# Patient Record
Sex: MALE | Race: WHITE | NOT HISPANIC OR LATINO | URBAN - METROPOLITAN AREA
[De-identification: names, ages, dates, MRNs, and addresses within clinical notes are randomized per-mention and may not be internally consistent; named-entity substitution may affect disease eponyms.]

---

## 2017-02-24 ENCOUNTER — OFFICE VISIT - HEALTHEAST (OUTPATIENT)
Dept: FAMILY MEDICINE | Facility: CLINIC | Age: 32
End: 2017-02-24

## 2017-02-24 DIAGNOSIS — Z01.818 PREOPERATIVE EXAMINATION: ICD-10-CM

## 2017-02-24 DIAGNOSIS — D69.6 TEMPORARY LOW PLATELET COUNT (H): ICD-10-CM

## 2017-02-24 ASSESSMENT — MIFFLIN-ST. JEOR: SCORE: 1977.5

## 2017-03-25 ENCOUNTER — OFFICE VISIT - HEALTHEAST (OUTPATIENT)
Dept: FAMILY MEDICINE | Facility: CLINIC | Age: 32
End: 2017-03-25

## 2017-03-25 DIAGNOSIS — R05.9 COUGH: ICD-10-CM

## 2017-03-25 DIAGNOSIS — H65.91 MIDDLE EAR EFFUSION, RIGHT: ICD-10-CM

## 2017-03-25 DIAGNOSIS — J11.1 INFLUENZA-LIKE ILLNESS: ICD-10-CM

## 2017-03-31 ENCOUNTER — OFFICE VISIT - HEALTHEAST (OUTPATIENT)
Dept: FAMILY MEDICINE | Facility: CLINIC | Age: 32
End: 2017-03-31

## 2017-03-31 DIAGNOSIS — H92.01 EAR PAIN, REFERRED, RIGHT: ICD-10-CM

## 2017-03-31 DIAGNOSIS — H69.91 EUSTACHIAN TUBE DISORDER, RIGHT: ICD-10-CM

## 2017-09-11 ENCOUNTER — COMMUNICATION - HEALTHEAST (OUTPATIENT)
Dept: TELEHEALTH | Facility: CLINIC | Age: 32
End: 2017-09-11

## 2017-09-11 ENCOUNTER — OFFICE VISIT - HEALTHEAST (OUTPATIENT)
Dept: FAMILY MEDICINE | Facility: CLINIC | Age: 32
End: 2017-09-11

## 2017-09-11 ENCOUNTER — COMMUNICATION - HEALTHEAST (OUTPATIENT)
Dept: FAMILY MEDICINE | Facility: CLINIC | Age: 32
End: 2017-09-11

## 2017-09-11 DIAGNOSIS — M54.50 LOW BACK PAIN: ICD-10-CM

## 2017-09-11 DIAGNOSIS — L72.3 SEBACEOUS CYST: ICD-10-CM

## 2017-09-14 ENCOUNTER — COMMUNICATION - HEALTHEAST (OUTPATIENT)
Dept: HEALTH INFORMATION MANAGEMENT | Facility: CLINIC | Age: 32
End: 2017-09-14

## 2017-09-20 ENCOUNTER — RECORDS - HEALTHEAST (OUTPATIENT)
Dept: ADMINISTRATIVE | Facility: OTHER | Age: 32
End: 2017-09-20

## 2018-01-05 ENCOUNTER — OFFICE VISIT - HEALTHEAST (OUTPATIENT)
Dept: FAMILY MEDICINE | Facility: CLINIC | Age: 33
End: 2018-01-05

## 2018-01-05 DIAGNOSIS — R07.9 CHEST PAIN: ICD-10-CM

## 2018-01-05 DIAGNOSIS — R09.1 PLEURISY: ICD-10-CM

## 2018-05-20 ENCOUNTER — OFFICE VISIT - HEALTHEAST (OUTPATIENT)
Dept: FAMILY MEDICINE | Facility: CLINIC | Age: 33
End: 2018-05-20

## 2018-05-20 DIAGNOSIS — R09.81 CONGESTION OF NASAL SINUS: ICD-10-CM

## 2018-08-06 ENCOUNTER — OFFICE VISIT - HEALTHEAST (OUTPATIENT)
Dept: FAMILY MEDICINE | Facility: CLINIC | Age: 33
End: 2018-08-06

## 2018-08-06 DIAGNOSIS — H66.91 RIGHT OTITIS MEDIA: ICD-10-CM

## 2018-08-06 DIAGNOSIS — J06.9 URI (UPPER RESPIRATORY INFECTION): ICD-10-CM

## 2018-08-06 LAB — DEPRECATED S PYO AG THROAT QL EIA: NORMAL

## 2018-08-07 LAB — GROUP A STREP BY PCR: NORMAL

## 2018-10-10 ENCOUNTER — AMBULATORY - HEALTHEAST (OUTPATIENT)
Dept: NURSING | Facility: CLINIC | Age: 33
End: 2018-10-10

## 2018-10-18 ENCOUNTER — OFFICE VISIT - HEALTHEAST (OUTPATIENT)
Dept: FAMILY MEDICINE | Facility: CLINIC | Age: 33
End: 2018-10-18

## 2018-10-18 DIAGNOSIS — H66.90 AOM (ACUTE OTITIS MEDIA): ICD-10-CM

## 2021-05-28 ENCOUNTER — RECORDS - HEALTHEAST (OUTPATIENT)
Dept: ADMINISTRATIVE | Facility: CLINIC | Age: 36
End: 2021-05-28

## 2021-05-30 VITALS — BODY MASS INDEX: 30.39 KG/M2 | WEIGHT: 221 LBS

## 2021-05-30 VITALS — BODY MASS INDEX: 30.08 KG/M2 | HEIGHT: 72 IN | WEIGHT: 222.1 LBS

## 2021-05-31 VITALS — WEIGHT: 231.1 LBS | BODY MASS INDEX: 31.78 KG/M2

## 2021-05-31 VITALS — WEIGHT: 225.5 LBS | BODY MASS INDEX: 31.01 KG/M2

## 2021-06-01 VITALS — WEIGHT: 215.58 LBS | BODY MASS INDEX: 29.65 KG/M2

## 2021-06-01 VITALS — BODY MASS INDEX: 28.61 KG/M2 | WEIGHT: 208 LBS

## 2021-06-02 VITALS — BODY MASS INDEX: 31.12 KG/M2 | WEIGHT: 226.3 LBS

## 2021-06-09 NOTE — PROGRESS NOTES
Assessment/Plan:      Visit for Preoperative Exam.     Patient approved for surgery with general or local anesthesia. Above recommendations were reviewed with the patient. Copy of the pre-op was given to the patient to bring along on the day of surgery. Follow up as needed. No active cardiac conditions.     Subjective:     Scheduled Procedure: bilateral vasectomy   Surgery Date:  3/17/17  Surgery Location:  [facility]  Surgeon:  Dr. Peres    History of passing out with shots, watching wife's spinal tap and son's circumcision. Also anxiety    Has a history of bleeding a bit more than expected; History of slightly low platelets    Current Outpatient Prescriptions   Medication Sig Dispense Refill     cyclobenzaprine (FLEXERIL) 10 MG tablet 1/2 to 1 tab twice a day prn  for 2-5 days as needed for muscle spasm of back 10 tablet 0     ibuprofen (ADVIL,MOTRIN) 200 MG tablet Take 600 mg by mouth every 6 (six) hours as needed for pain.       multivitamin therapeutic (THERAGRAN) tablet Take 1 tablet by mouth daily.       naproxen (NAPROSYN) 500 MG tablet Half to 1 tab twice a day prn for 4-5 days as needed for back pain 10 tablet 0     VITAMIN B COMPLEX (B COMPLEX 1 ORAL) Take by mouth daily.       No current facility-administered medications for this visit.        No Known Allergies    Immunization History   Administered Date(s) Administered     Influenza, inj, historic 12/19/2012     Influenza, seasonal,quad inj 36+ mos 10/05/2015     Tdap 02/01/2009       Patient Active Problem List   Diagnosis     Palpitations     Allergies     Spina bifida occulta       Past Medical History:   Diagnosis Date     H/O scarlet fever second grade       Social History     Social History     Marital status:      Spouse name: Brittanie     Number of children: 2     Years of education: 4     Occupational History      for now Running [employer]     Social History Main Topics     Smoking status: Never Smoker      "Smokeless tobacco: Not on file     Alcohol use 1.8 oz/week     3 Cans of beer per week     Drug use: No     Sexual activity: Yes     Birth control/ protection: None     Other Topics Concern     Not on file     Social History Narrative    [employer]    Law enforcement degree       Past Surgical History:   Procedure Laterality Date     WISDOM TOOTH EXTRACTION  @ 19       History of Present Illness  Recent Health  Fever: no  Chills: no  Fatigue: no  Chest Pain: no  Cough: no  Dyspnea: no  Urinary Frequency: no  Nausea: no  Vomiting: no  Diarrhea: no  Abdominal Pain: no  Easy Bruising: no, does report that with tattoos, that he bleeds a bit more than expected  Lower Extremity Swelling: no  Poor Exercise Tolerance: no    Most recent Health Maintenance Visit:  2 year(s) ago    Pertinent History  Prior Anesthesia: yes  Previous Anesthesia Reaction:  no  Diabetes: no  Cardiovascular Disease: no  Pulmonary Disease: no  Renal Disease: no  GI Disease: no  Sleep Apnea: no  Thromboembolic Problems: no  Clotting Disorder: no  Bleeding Disorder: no  Transfusion Reaction: no  Impaired Immunity: no  Steroid use in the last 6 months: no  Frequent Aspirin use: no    Family history of none    Social history of there is no transfusion refusal    After surgery, the patient plans to recover at home with family.    Review of Systems  A 12 point comprehensive review of systems was negative except as noted.          Objective:         Vitals:    02/24/17 1400   BP: 116/80   Pulse: 72   Weight: 222 lb 1.6 oz (100.7 kg)   Height: 5' 11.5\" (1.816 m)       Physical Exam:  Physical Exam:  General Appearance: Alert, cooperative, no distress, appears stated age  Head: Normocephalic, without obvious abnormality, atraumatic  Eyes: PERRL, conjunctiva/corneas clear, EOM's intact  Ears: Normal TM's and external ear canals, both ears  Nose: Nares normal, septum midline,mucosa normal, no drainage  Throat: Lips, mucosa, and " tongue normal; teeth and gums normal  Neck: Supple, symmetrical, trachea midline, no adenopathy;  thyroid: not enlarged, symmetric, no tenderness/mass/nodules; no carotid bruit or JVD  Back: Symmetric, no curvature, ROM normal, no CVA tenderness  Lungs: Clear to auscultation bilaterally, respirations unlabored  Heart: Regular rate and rhythm, S1 and S2 normal, no murmur, rub, or gallop,  Abdomen: Soft, non-tender, bowel sounds active all four quadrants,  no masses, no organomegaly   Musculoskeletal: Normal range of motion. No joint swelling or deformity.   Extremities: Extremities normal, atraumatic, no cyanosis or edema  Skin: Skin color, texture, turgor normal, no rashes or lesions  Lymph nodes: Cervical, supraclavicular, and axillary nodes normal  Neurologic: He is alert. He has normal reflexes.   Psychiatric: He has a normal mood and affect.       CBC and INR today

## 2021-06-09 NOTE — PROGRESS NOTES
Chief complaint right ear pain with fluid    HPI: The patient was seen at walk-in care about 5 days ago and told he had fluid in his ears but he still having some ear pain on the right and muffled sounds on the right and wonders how long it is going to take to get better.  He has no fevers chills sweats and the Mucinex and Sudafed have helped only marginally.    Objective:/70 (Patient Site: Right Arm, Patient Position: Sitting, Cuff Size: Adult Regular)  Pulse 76  He is in no acute distress.  His nasal mucosa are pretty unremarkable he has no facial tenderness no posterior pharyngeal drainage neck is supple without lymphadenopathy.  TMs are gray bilaterally with a little bit of congestion noted but no thickening, pus, or erythema to the eardrum itself    Assessment: Probable eustachian tube dysfunction    Plan: We will start with Afrin or Dristan followed by Flonase for a week or 2 although the Afrin only 3 days and then if things are not getting better or if they are getting worse then we will try antibiotic and I gave him a printed prescription to fill if he needed it today

## 2021-06-09 NOTE — PROGRESS NOTES
ASSESSMENT:   1. Middle ear effusion, right     2. Influenza-like illness     3. Cough  albuterol (PROVENTIL HFA;VENTOLIN HFA) 90 mcg/actuation inhaler    inhalational spacing device (AEROCHAMBER MV) Spcr        PLAN:  Ear symptoms likely secondary to effusion/eustachian tube dysfunction.  Recommend Sudafed 120m tablet every 12 hours as needed for congestion/ear pressure.  May want to skip nighttime dose due to insomnia.  Follow up with primary care provider if not improving in 3-5 days, sooner if any worsening or new symptoms. Advised it may take a week or two for tinnitus and hearing to improve but should gradually get better.     Suspect patient had influenza though out of treatment window and he is improving, thus testing was deferred today.  Ventolin inhaler: 2 puffs every 4-6 hours as needed for cough. Use with spacer.  Teaching provided by nursing staff. Gradually taper over 5-7 days as cough improves. Follow up with primary care provider if not improving in 5-7 days, sooner if any worsening or new symptoms such as shortness of breath, fever returns, or you feel more ill.    SUBJECTIVE:   Braxton Leroy is a 31 y.o. male presents today with 3 days complaint of right ear popping.  He had a bad coughing episode and felt his right ear pop and felt a sharp pain. He has chronic tinnitus and known moderate sensorineural hearing loss bilaterally. Was assessed by audiology about a year ago. Tinnitus has been more pronounced and hearing feels muffled in the right ear.  He also complains of nasal congestion, rhinorrhea, body aches, chills, and cough for 5 days.  Has not checked temperature at home. Feels symptoms have improved other than his cough. Sick contacts: none, though works in a [employer] and is around a lot of people. Has tried Dayquil/Nyquil without relief. He did not receive a flu shot this year. History of asthma as a child but has not used inhalers as an adult.  He used his wife's albuterol inhaler  which he feels was helpful for his cough.    Denies ear drainage. . Denies shortness of breath, chest pain.Denies vomiting, diarrhea.     Patient Active Problem List   Diagnosis     Palpitations     Allergies     Spina bifida occulta       History   Smoking Status     Never Smoker   Smokeless Tobacco     Not on file       Current Medications:  Current Outpatient Prescriptions on File Prior to Visit   Medication Sig Dispense Refill     cyclobenzaprine (FLEXERIL) 10 MG tablet 1/2 to 1 tab twice a day prn  for 2-5 days as needed for muscle spasm of back 10 tablet 0     ibuprofen (ADVIL,MOTRIN) 200 MG tablet Take 600 mg by mouth every 6 (six) hours as needed for pain.       multivitamin therapeutic (THERAGRAN) tablet Take 1 tablet by mouth daily.       naproxen (NAPROSYN) 500 MG tablet Half to 1 tab twice a day prn for 4-5 days as needed for back pain 10 tablet 0     VITAMIN B COMPLEX (B COMPLEX 1 ORAL) Take by mouth daily.       No current facility-administered medications on file prior to visit.        Allergies:   No Known Allergies    OBJECTIVE:   Vitals:    03/25/17 1038   BP: 118/62   Pulse: 77   Resp: 18   Temp: 98.1  F (36.7  C)   TempSrc: Oral   SpO2: 96%   Weight: 221 lb (100.2 kg)     Physical exam reveals a pleasant 31 y.o. male.   Appears healthy, alert, cooperative and in NAD.  Eyes:  No conjunctivitis, lids normal.   Ears:  Both external ear canals clear of cerumen and without edema or erythema. R TM is thin, has serous fluid, but good light reflex. No erythema of TM. L TM is gray with a thin membrane and good light reflex.   Nose:    Mucosa normal. Scant clear rhinorrhea  Mouth:  Mucosa pink and moist.  no pharyngitis, no exudate and uvula is midline.    Lymph: no cervical LAD  Lungs: Chest is clear, no wheezing, rhonchi or rales. Symmetric air entry throughout both lung fields.  Heart: regular rate and rhythm, no murmur, rub or gallop

## 2021-06-18 NOTE — PROGRESS NOTES
Chief Complaint   Patient presents with     URI     Sinus infection, x 3 days. Dark, bloody phlegm when coughing. Congestion in back of throat. Throat irritation and scratchy.          HPI    Patient is here for 3 days of nasal discharge and congestion with postnasal drip. No cough, fever, sinus pain.    ROS: Pertinent ROS noted in HPI.     Allergies   Allergen Reactions     Other Allergy (See Comments) Itching       Patient Active Problem List   Diagnosis     Palpitations     Allergies     Spina bifida occulta       Family History   Problem Relation Age of Onset     Arthritis Father      Diabetes Paternal Uncle      Heart disease Maternal Grandmother      Heart disease Maternal Grandfather      Heart disease Paternal Grandmother      Arthritis Paternal Grandmother      Diabetes Paternal Grandfather        Social History     Social History     Marital status:      Spouse name: Brittanie     Number of children: 2     Years of education: 4     Occupational History      for now Running [employer]     Social History Main Topics     Smoking status: Never Smoker     Smokeless tobacco: Never Used     Alcohol use 1.8 oz/week     3 Cans of beer per week     Drug use: No     Sexual activity: Yes     Birth control/ protection: None     Other Topics Concern     Not on file     Social History Narrative    [employer]    Law enforcement degree         Objective:    Vitals:    05/20/18 1134   BP: 120/70   Pulse: 71   Resp: 16   Temp: 98.1  F (36.7  C)   SpO2: 98%       Gen:NAD  Throat: oropharynx clear, tonsils normal  Ears: TMs clear without effusion, ear canals normal with minimal cerumen  Nose: no discharge, normal nasal mucosa  Sinus: no pain to percussion  Neck:NAD  CV: RRR, normal S1S2, no M, R, G  Pulm: CTAB, normal effort    Impression:    Congestion of nasal sinus - benign exam    Plan:    Supportive cares as directed.  F/u as directed.

## 2021-06-19 NOTE — PROGRESS NOTES
"ASSESSMENT:   1. Right otitis media  Rapid Strep A Screen-Throat    amoxicillin (AMOXIL) 875 MG tablet   2. URI (upper respiratory infection)         PLAN:  32-year-old male presents for evaluation of URI symptoms.  Exam does reveal a right otitis media.  He is prescribed a 10 day course of amoxicillin to treat this.  Rapid strep testing is negative.  Symptomatic care is advised for URI symptoms.  He will follow-up with primary care in 1 week if no improvement in symptoms.  Will return to clinic with new or worsening symptoms.    I discussed red flag symptoms, return precautions to clinic/ER and follow up care with patient/parent.  Expected clinical course, symptomatic cares advised. Questions answered. Patient/parent amenable with plan.    Patient Instructions:  Patient Instructions   You have an infection of your right ear.    Take amoxicillin twice daily with food. Take a probiotic such as Culturelle or Florastor while on the antibiotic or eat a Greek yogurt containing \"live active cultures\" daily.    You also have an upper respiratory infection.    Symptomatic cares recommended:  -nasal saline rinses/sprays 1-2 times daily. Obtain nasal saline spray (Ayr or Ocean are brand names).  Get into a hot shower, and wait for the sensation of your sinuses \"opening\". Occlude one side of your nose, and use a gentle spray of saline into the opposite side of your nose.  Then blow your nose to try to mobilize the nasal secretions.  -adequate rest  -copious hydration  -ibuprofen or acetaminophen for comfort  -Robitussin or Mucinex as needed for cough, nasal congestion  -throat lozenges as needed for sore throat    Follow-up with primary care provider if not improving in 7-10 days, sooner if worsening.     If you develop high fevers that do not go down with ibuprofen/Tylenol, shortness of breath, cough up any blood, chest pain, or any other new, concerning symptoms, please go to the ER for further evaluation.        SUBJECTIVE: "   Braxton Leroy is a 32 y.o. male who presents today with sore throat, facial pain, right ear pain, cough, nasal congestion and runny nose for the past 5 days.  No fevers.  Cough is productive.  No chest pain or shortness of breath.  No hemoptysis.  No nausea or vomiting, no diarrhea.      ROS:  Comprehensive 12 pt ROS completed, positives noted in HPI, otherwise negative.      Past Medical History:  Patient Active Problem List   Diagnosis     Palpitations     Allergies     Spina bifida occulta       Surgical History:  Past Surgical History:   Procedure Laterality Date     WISDOM TOOTH EXTRACTION  @ 19           Family History:  Family History   Problem Relation Age of Onset     Arthritis Father      Diabetes Paternal Uncle      Heart disease Maternal Grandmother      Heart disease Maternal Grandfather      Heart disease Paternal Grandmother      Arthritis Paternal Grandmother      Diabetes Paternal Grandfather        Reviewed; Non-contributory    History   Smoking Status     Never Smoker   Smokeless Tobacco     Never Used       Current Medications:  Current Outpatient Prescriptions on File Prior to Visit   Medication Sig Dispense Refill     ibuprofen (ADVIL,MOTRIN) 200 MG tablet Take 600 mg by mouth every 6 (six) hours as needed for pain.       multivitamin therapeutic (THERAGRAN) tablet Take 1 tablet by mouth daily.       pseudoephedrine HCl (SUDAFED ORAL) Take by mouth.       No current facility-administered medications on file prior to visit.        Allergies:   Allergies   Allergen Reactions     Other Allergy (See Comments) Itching       OBJECTIVE:   Vitals:    08/06/18 0811   BP: 130/72   Patient Site: Right Arm   Patient Position: Sitting   Cuff Size: Adult Large   Pulse: 96   Resp: 18   Temp: 98.2  F (36.8  C)   TempSrc: Oral   SpO2: 98%   Weight: 215 lb 9.3 oz (97.8 kg)     Physical exam reveals a pleasant 32 y.o. male.   Appears healthy, alert and cooperative. Non-toxic appearance.  Eyes:  No  conjunctivitis, lids normal.   Ears:  Normal appearing auricle. External auditory meatus without excessive cerumen, edema or erythema. right TM erythematous, bulging and air-fluid level, left TM clear and normal canals bilaterally. No mastoid tenderness. No pain with palpation over tragus.  Nose:    Mucosa normal. Clera rhinorrhea. No sinus tenderness.  Mouth:  Mucosa pink and moist.  moderate erythema. No trismus. No evidence of PTA. Normal phonation.  Neck: no adenopathy  Lungs: Chest is clear, no wheezing, rhonchi or rales. Symmetric air entry throughout both lung fields.  Heart: regular rate and rhythm, no murmur, rub or gallop  Abdomen: soft, nontender. No masses or organomegaly  Skin: pink, warm, dry, and without lesions on limited skin exam. No rashes.     RADIOLOGY    none  LABORATORY STUDIES    Recent Results (from the past 24 hour(s))   Rapid Strep A Screen-Throat   Result Value Ref Range    Rapid Strep A Antigen No Group A Strep detected, presumptive negative No Group A Strep detected, presumptive negative           Antonia Padilla, CNP

## 2021-06-21 NOTE — PROGRESS NOTES
Chief complaint: Bilateral otitis media partially treated    HPI: The patient reports that he had an ear infection in August and did not finish the prescription.  He had a recurrence of bilateral ear pain and went to an urgent care and was started on Augmentin twice a day.  He has had 6-1/2 days worth of medication and he still feels as though he is under water.    Objective:/66 (Patient Site: Right Arm, Patient Position: Sitting, Cuff Size: Adult Regular)  Pulse 76  Wt (!) 226 lb 4.8 oz (102.6 kg)  BMI 31.12 kg/m2  He has a fluid bubble medially and his TM and it is yellow fluid I suspect that it was much more yellow and thick previously so it is resolving but is still present.  The TM on the right has some fluid in the TM itself looks more congested but is not full of pus.  Throat is clear there is no drainage    Assessment: Partially treated bilateral otitis media    Plan: Admit to stand his except prescription for about 5 days as I do not think just 10 days is going to completely clear this.  I have warned him that it could take 4-6 weeks for all of the fluid to completely resolve so if he is still feeling fluid after about a month he should let me know and we will decide whether or not we need to do an ENT consult.

## 2021-06-26 NOTE — PROGRESS NOTES
Progress Notes by Jeremiah Walker DO at 1/5/2018  5:30 PM     Author: Jeremiah Walker DO Service: -- Author Type: Physician    Filed: 1/6/2018  8:59 AM Encounter Date: 1/5/2018 Status: Signed    : Jeremiah Walker DO (Physician)       Chief Complaint   Patient presents with   ? Shoulder Pain     x 2 days.right side has sharp pain        History of Present Illness: Nursing notes reviewed. Patient has left upper sharp chest pain, mostly anterior so it is also a little bit in the left rhomboid region, worse with a deeper inspiration. No recent coughing, fevers, or chills. No shortness of breath.     Review of systems: See history of present illness, otherwise negative.     Current Outpatient Prescriptions   Medication Sig Dispense Refill   ? ibuprofen (ADVIL,MOTRIN) 200 MG tablet Take 600 mg by mouth every 6 (six) hours as needed for pain.     ? multivitamin therapeutic (THERAGRAN) tablet Take 1 tablet by mouth daily.       No current facility-administered medications for this visit.        Past Medical History:   Diagnosis Date   ? H/O scarlet fever second grade      Past Surgical History:   Procedure Laterality Date   ? WISDOM TOOTH EXTRACTION  @ 19      Social History     Social History   ? Marital status:      Spouse name: Brittanie   ? Number of children: 2   ? Years of education: 4     Occupational History   ?  for now Running [employer]     Social History Main Topics   ? Smoking status: Never Smoker   ? Smokeless tobacco: Never Used   ? Alcohol use 1.8 oz/week     3 Cans of beer per week   ? Drug use: No   ? Sexual activity: Yes     Birth control/ protection: None     Other Topics Concern   ? None     Social History Narrative    [employer]    Law enforcement degree       History   Smoking Status   ? Never Smoker   Smokeless Tobacco   ? Never Used      Exam:   Blood pressure 106/60, pulse 78, temperature 97.9  F (36.6  C), temperature source Oral, resp. rate 14, weight  (!) 231 lb 1.6 oz (104.8 kg), SpO2 98 %.    EXAM:   General: Vital signs reviewed. Patient is in no acute appearing distress with no coughing at exam. Breathing is non labored appearing. Patient is alert and oriented x 3.   ENT: Tympanic membranes are clear and without injection bilaterally, nasal turbinates show no injection or rhinorrhea, no pharyngeal injection or exudate.  Neck: supple with no adenopathy.  Heart: Normal rate and rhythm without murmur  Lungs: Clear to auscultation with good air flow bilaterally.  Skin: warm and dry  Xray study reviewed by me with patient at time of exam, with no abnormality noted. This was noted by radiologist also per report which was reviewed with patient at exam.    Assessment/Plan   1. Chest pain  XR Chest 2 Views   2. Pleurisy         Patient Instructions     Be seen again in 4-5 days if symptoms are not better, sooner if feeling any worse.    Pleurisy  You have pain in your chest. Your healthcare provider has told you that you have pleurisy, or pleuritis. Pleurisy is swelling (inflammation) of the pleura. The pleura are two layers of thin smooth tissue that surround the lungs and line the chest.  What are the symptoms of pleurisy?     Pleurisy is inflammation of the pleura. The pleura cover the lungs and line the chest.   Pleurisy usually causes sharp chest pain. It is usually worse when you take a deep breath, cough, or sneeze.  What causes pleurisy?  Many things can cause pleurisy. A common cause is a viral infection like the flu or pneumonia. Serious lung problems that can cause it include:    A blood clot in the lung (pulmonary embolism)    Air between the pleura (pneumothorax)  Serious heart problems that can cause pleurisy include:    Heart attack    Inflammation of the covering of the heart (pericarditis)  How is pleurisy diagnosed?  Your healthcare provider examines you and asks you about your symptoms and health history. He or she will first check you for the  serious causes of chest pain. You may have:    Lab tests    Imaging tests such as chest X-ray, CT scan, or ultrasound    EKG  How is pleurisy treated?  Treatment depends on what is causing the pleurisy. Serious conditions are treated in the hospital. You may need medicines to decrease the inflammation and pain.     Call 911  Call 911 if any of these occur:    Trouble breathing    Chest pain that gets worse  Call your healthcare provider  Call your healthcare provider right away if you have:    A fever of 100.4 F (38 C) or higher, or as directed by your healthcare provider   Date Last Reviewed: 11/1/2016 2000-2016 [employer]. [address] [place] [address], [place] [address]. All rights reserved. This information is not intended as a substitute for professional medical care. Always follow your healthcare professional's instructions.           Jeremiah Walker, DO

## 2021-07-24 ENCOUNTER — HEALTH MAINTENANCE LETTER (OUTPATIENT)
Age: 36
End: 2021-07-24

## 2021-09-18 ENCOUNTER — HEALTH MAINTENANCE LETTER (OUTPATIENT)
Age: 36
End: 2021-09-18

## 2022-08-20 ENCOUNTER — HEALTH MAINTENANCE LETTER (OUTPATIENT)
Age: 37
End: 2022-08-20

## 2022-11-20 ENCOUNTER — HEALTH MAINTENANCE LETTER (OUTPATIENT)
Age: 37
End: 2022-11-20

## 2023-09-10 ENCOUNTER — HEALTH MAINTENANCE LETTER (OUTPATIENT)
Age: 38
End: 2023-09-10